# Patient Record
Sex: FEMALE | URBAN - METROPOLITAN AREA
[De-identification: names, ages, dates, MRNs, and addresses within clinical notes are randomized per-mention and may not be internally consistent; named-entity substitution may affect disease eponyms.]

---

## 2018-12-13 ENCOUNTER — IMPORTED ENCOUNTER (OUTPATIENT)
Dept: URBAN - METROPOLITAN AREA CLINIC 31 | Facility: CLINIC | Age: 83
End: 2018-12-13

## 2018-12-13 PROBLEM — Z96.1: Noted: 2018-12-13

## 2018-12-13 PROBLEM — H02.035: Noted: 2018-12-13

## 2018-12-13 PROCEDURE — 92004 COMPRE OPH EXAM NEW PT 1/>: CPT

## 2018-12-13 NOTE — PATIENT DISCUSSION
1.  Entropion Left Lower Lid:  The patient has an inturned left lower eyelid. The patient is  experiencing symptoms and/or there is rubbing of the lashes against the cornea. Schedule left lower eyelid entropion repair. 2. Pseudophakia OU - IOLs stable. Monitor.

## 2019-01-15 ENCOUNTER — IMPORTED ENCOUNTER (OUTPATIENT)
Dept: URBAN - METROPOLITAN AREA CLINIC 31 | Facility: CLINIC | Age: 84
End: 2019-01-15

## 2019-01-15 PROBLEM — Z98.89: Noted: 2019-01-15

## 2019-01-15 PROCEDURE — 99024 POSTOP FOLLOW-UP VISIT: CPT

## 2019-01-15 NOTE — PATIENT DISCUSSION
Post Operative: Doing well po drops as instructed tears prn. Call with any problems. LLL entropion repair - resolved

## 2022-02-08 NOTE — PATIENT DISCUSSION
***2/28/22 The patient has reconsidered there goal for surgery. The patient was informed that with [address] [facility] for distance, they will need glasses for all near and intermediate activities after surgery. The patient understands there is a possibility they may need an enhancement after surgery. The patient elects [employer] OD, goal of emmetropia. MMathis****.

## 2022-02-08 NOTE — PATIENT DISCUSSION
Advised patient that she would need to get pterygium removed before cat sx OS if she elects advanced.

## 2022-02-08 NOTE — PATIENT DISCUSSION
**** 2/28/22 The patient has reconsidered there goal for surgery. Reassess for Custom OS, goal of emmetropia. MMathis****.

## 2022-03-07 NOTE — PATIENT DISCUSSION
***2/28/22 The patient has reconsidered there goal for surgery. The patient was informed that with [address] [facility] for distance, they will need glasses for all near and intermediate activities after surgery. The patient understands there is a possibility they may need an enhancement after surgery. The patient elects [employer] OD, goal of emmetropia. MMathis****.

## 2022-03-07 NOTE — PATIENT DISCUSSION
***2/28/22 The patient has reconsidered there goal for surgery. The patient was informed that with [address] [facility] for distance, they will need glasses for all near and intermediate activities after surgery. The patient understands there is a possibility they may need an enhancement after surgery. The patient elects [employer] OD, goal of emmetropia. MMathis****.

## 2022-04-01 ASSESSMENT — TONOMETRY
OD_IOP_MMHG: 10
OS_IOP_MMHG: 12

## 2022-04-01 ASSESSMENT — VISUAL ACUITY
OS_CC: 20/70+1
OD_CC: 20/50+1
OU_CC: 20/50